# Patient Record
Sex: FEMALE | Race: WHITE | Employment: FULL TIME | URBAN - METROPOLITAN AREA
[De-identification: names, ages, dates, MRNs, and addresses within clinical notes are randomized per-mention and may not be internally consistent; named-entity substitution may affect disease eponyms.]

---

## 2021-09-03 ENCOUNTER — HOSPITAL ENCOUNTER (OUTPATIENT)
Dept: MAMMOGRAPHY | Age: 41
Discharge: HOME | End: 2021-09-03
Attending: FAMILY MEDICINE

## 2021-09-03 DIAGNOSIS — Z12.31 ENCOUNTER FOR SCREENING MAMMOGRAM FOR BREAST CANCER: ICD-10-CM

## 2022-06-06 DIAGNOSIS — R05.9 COUGH, UNSPECIFIED: ICD-10-CM

## 2022-06-06 RX ORDER — CODEINE PHOSPHATE AND GUAIFENESIN 10; 100 MG/5ML; MG/5ML
SOLUTION ORAL
Qty: 120 ML | Refills: 0 | OUTPATIENT
Start: 2022-06-06

## 2022-08-18 RX ORDER — DICLOFENAC SODIUM 75 MG/1
TABLET, DELAYED RELEASE ORAL
Qty: 60 TABLET | Refills: 6 | Status: SHIPPED | OUTPATIENT
Start: 2022-08-18

## 2022-09-09 ENCOUNTER — HOSPITAL ENCOUNTER (OUTPATIENT)
Dept: MAMMOGRAPHY | Age: 42
Discharge: HOME | End: 2022-09-12

## 2022-09-09 DIAGNOSIS — Z12.31 VISIT FOR SCREENING MAMMOGRAM: ICD-10-CM

## 2022-09-20 DIAGNOSIS — R63.5 ABNORMAL WEIGHT GAIN: ICD-10-CM

## 2022-09-22 RX ORDER — PHENTERMINE HYDROCHLORIDE 37.5 MG/1
TABLET ORAL
Qty: 30 TABLET | Refills: 3 | OUTPATIENT
Start: 2022-09-22

## 2022-09-24 DIAGNOSIS — R63.5 ABNORMAL WEIGHT GAIN: Primary | ICD-10-CM

## 2022-09-26 RX ORDER — PHENTERMINE HYDROCHLORIDE 37.5 MG/1
TABLET ORAL
Qty: 30 TABLET | Refills: 5 | Status: SHIPPED | OUTPATIENT
Start: 2022-09-26 | End: 2023-03-25

## 2023-01-08 ENCOUNTER — PATIENT MESSAGE (OUTPATIENT)
Dept: FAMILY MEDICINE CLINIC | Facility: CLINIC | Age: 43
End: 2023-01-08

## 2023-01-08 DIAGNOSIS — R42 DIZZINESS: ICD-10-CM

## 2023-01-08 DIAGNOSIS — R51.9 ACUTE NONINTRACTABLE HEADACHE, UNSPECIFIED HEADACHE TYPE: Primary | ICD-10-CM

## 2023-01-09 NOTE — TELEPHONE ENCOUNTER
From: Olivia Reyes  To: Dr. Rukhsana Adams: 1/8/2023 8:47 PM EST  Subject: CT Scan    Dr Karan Garcia  I had a few episodes Friday where I felt light headed, broke out in sweats, blurry vision, dizziness  i went on to the ER out of concern and my BP was a little high- which has never been an issue - but there were no other concerns. After getting home, I began to have a headache & kept a headache for over 24 hours.    After [facility], I want to ease my conscience and wanted to inquire about a CT w contrast

## 2023-01-09 NOTE — TELEPHONE ENCOUNTER
I will go ahead and put in an order a CT for the patient, if symptoms worsen she may need to go back to the ER

## 2023-01-10 NOTE — TELEPHONE ENCOUNTER
Regarding: CT Scan  ----- Message from Dillan Banerjee MD sent at 1/9/2023  1:56 PM EST -----       ----- Message from Vivek Ortez \"Karen\" to Dillan Banerjee MD sent at 1/8/2023  8:47 PM -----   Dr Gemma Edwards  I had a few episodes Friday where I felt light headed, broke out in sweats, blurry vision, dizziness  i went on to the ER out of concern and my BP was a little high- which has never been an issue -  but there were no other concerns. After getting home, I began to have a headache & kept a headache for over 24 hours.    After Vernal Legato scare, I want to ease my conscience and wanted to inquire about a CT w contrast

## 2023-01-20 ENCOUNTER — HOSPITAL ENCOUNTER (OUTPATIENT)
Dept: CT IMAGING | Age: 43
Discharge: HOME | End: 2023-01-20
Payer: COMMERCIAL

## 2023-01-20 DIAGNOSIS — R42 DIZZINESS: ICD-10-CM

## 2023-01-20 DIAGNOSIS — R51.9 ACUTE NONINTRACTABLE HEADACHE, UNSPECIFIED HEADACHE TYPE: ICD-10-CM

## 2023-01-20 PROCEDURE — 70450 CT HEAD/BRAIN W/O DYE: CPT

## 2023-01-21 NOTE — RESULT ENCOUNTER NOTE
The CT scan overall looks normal, let me know if you are continuing to have any persistent problems.

## 2023-03-23 RX ORDER — DICLOFENAC SODIUM 75 MG/1
TABLET, DELAYED RELEASE ORAL
Qty: 60 TABLET | Refills: 6 | Status: SHIPPED | OUTPATIENT
Start: 2023-03-23

## 2023-03-30 DIAGNOSIS — R63.5 ABNORMAL WEIGHT GAIN: ICD-10-CM

## 2023-03-31 RX ORDER — PHENTERMINE HYDROCHLORIDE 37.5 MG/1
TABLET ORAL
Qty: 30 TABLET | Refills: 3 | Status: SHIPPED | OUTPATIENT
Start: 2023-03-31 | End: 2023-09-27

## 2023-04-11 ENCOUNTER — NURSE ONLY (OUTPATIENT)
Dept: FAMILY MEDICINE CLINIC | Facility: CLINIC | Age: 43
End: 2023-04-11

## 2023-04-11 DIAGNOSIS — E78.1 HYPERTRIGLYCERIDEMIA: ICD-10-CM

## 2023-04-11 DIAGNOSIS — R10.32 LLQ ABDOMINAL PAIN: ICD-10-CM

## 2023-04-11 DIAGNOSIS — R63.5 WEIGHT GAIN: ICD-10-CM

## 2023-04-11 LAB
ALBUMIN SERPL-MCNC: 4 G/DL (ref 3.5–5)
ALBUMIN/GLOB SERPL: 1.3 (ref 0.4–1.6)
ALP SERPL-CCNC: 51 U/L (ref 50–136)
ALT SERPL-CCNC: 25 U/L (ref 12–65)
ANION GAP SERPL CALC-SCNC: 4 MMOL/L (ref 2–11)
AST SERPL-CCNC: 18 U/L (ref 15–37)
BASOPHILS # BLD: 0 K/UL (ref 0–0.2)
BASOPHILS NFR BLD: 0 % (ref 0–2)
BILIRUB SERPL-MCNC: 0.4 MG/DL (ref 0.2–1.1)
BUN SERPL-MCNC: 12 MG/DL (ref 6–23)
CALCIUM SERPL-MCNC: 8.6 MG/DL (ref 8.3–10.4)
CHLORIDE SERPL-SCNC: 108 MMOL/L (ref 101–110)
CHOLEST SERPL-MCNC: 164 MG/DL
CO2 SERPL-SCNC: 26 MMOL/L (ref 21–32)
CREAT SERPL-MCNC: 0.7 MG/DL (ref 0.6–1)
DIFFERENTIAL METHOD BLD: NORMAL
EOSINOPHIL # BLD: 0.1 K/UL (ref 0–0.8)
EOSINOPHIL NFR BLD: 1 % (ref 0.5–7.8)
ERYTHROCYTE [DISTWIDTH] IN BLOOD BY AUTOMATED COUNT: 13.8 % (ref 11.9–14.6)
GLOBULIN SER CALC-MCNC: 3.2 G/DL (ref 2.8–4.5)
GLUCOSE SERPL-MCNC: 79 MG/DL (ref 65–100)
HCT VFR BLD AUTO: 38.9 % (ref 35.8–46.3)
HDLC SERPL-MCNC: 44 MG/DL (ref 40–60)
HDLC SERPL: 3.7
HGB BLD-MCNC: 12.6 G/DL (ref 11.7–15.4)
IMM GRANULOCYTES # BLD AUTO: 0 K/UL (ref 0–0.5)
IMM GRANULOCYTES NFR BLD AUTO: 0 % (ref 0–5)
LDLC SERPL CALC-MCNC: 105 MG/DL
LYMPHOCYTES # BLD: 1.5 K/UL (ref 0.5–4.6)
LYMPHOCYTES NFR BLD: 29 % (ref 13–44)
MCH RBC QN AUTO: 28.1 PG (ref 26.1–32.9)
MCHC RBC AUTO-ENTMCNC: 32.4 G/DL (ref 31.4–35)
MCV RBC AUTO: 86.6 FL (ref 82–102)
MONOCYTES # BLD: 0.4 K/UL (ref 0.1–1.3)
MONOCYTES NFR BLD: 7 % (ref 4–12)
NEUTS SEG # BLD: 3.2 K/UL (ref 1.7–8.2)
NEUTS SEG NFR BLD: 63 % (ref 43–78)
NRBC # BLD: 0 K/UL (ref 0–0.2)
PLATELET # BLD AUTO: 273 K/UL (ref 150–450)
PMV BLD AUTO: 9.8 FL (ref 9.4–12.3)
POTASSIUM SERPL-SCNC: 3.7 MMOL/L (ref 3.5–5.1)
PROT SERPL-MCNC: 7.2 G/DL (ref 6.3–8.2)
RBC # BLD AUTO: 4.49 M/UL (ref 4.05–5.2)
SODIUM SERPL-SCNC: 138 MMOL/L (ref 133–143)
TRIGL SERPL-MCNC: 75 MG/DL (ref 35–150)
TSH, 3RD GENERATION: 0.51 UIU/ML (ref 0.36–3.74)
VLDLC SERPL CALC-MCNC: 15 MG/DL (ref 6–23)
WBC # BLD AUTO: 5.2 K/UL (ref 4.3–11.1)

## 2023-04-25 ENCOUNTER — TELEPHONE (OUTPATIENT)
Dept: FAMILY MEDICINE CLINIC | Facility: CLINIC | Age: 43
End: 2023-04-25

## 2023-04-25 NOTE — TELEPHONE ENCOUNTER
See if we could postpone the CT scheduled for the MRI as it may take a couple days for me to get a hold of the peer to peer reviewer and let the patient know

## 2023-04-25 NOTE — TELEPHONE ENCOUNTER
Female from [facility] Verification Dept called stating pt is scheduled to have CT of abd and pelvis but when authorizing the pt's insurance states they must do a peer to peer. Requesting Dr. Alexis Mora call [phone] to complete peer to peer. Tracking number is [id number].

## 2023-04-28 NOTE — TELEPHONE ENCOUNTER
CT approved, Auth # [id number]. Approved until May 26, 2023. Will notify radiology let them know that apparently they had not received my clinical notes which was why they required the peer to peer.

## 2023-05-05 ENCOUNTER — HOSPITAL ENCOUNTER (OUTPATIENT)
Dept: CT IMAGING | Age: 43
Discharge: HOME | End: 2023-05-05
Payer: COMMERCIAL

## 2023-05-05 DIAGNOSIS — R10.32 LLQ ABDOMINAL PAIN: ICD-10-CM

## 2023-05-05 PROCEDURE — 74177 CT ABD & PELVIS W/CONTRAST: CPT

## 2023-05-05 PROCEDURE — 6360000004 HC RX CONTRAST MEDICATION: Performed by: FAMILY MEDICINE

## 2023-05-05 PROCEDURE — 2580000003 HC RX 258: Performed by: FAMILY MEDICINE

## 2023-05-05 RX ORDER — 0.9 % SODIUM CHLORIDE 0.9 %
100 INTRAVENOUS SOLUTION INTRAVENOUS ONCE
Status: COMPLETED | OUTPATIENT
Start: 2023-05-05 | End: 2023-05-05

## 2023-05-05 RX ORDER — SODIUM CHLORIDE 0.9 % (FLUSH) 0.9 %
10 SYRINGE (ML) INJECTION
Status: COMPLETED | OUTPATIENT
Start: 2023-05-05 | End: 2023-05-05

## 2023-05-05 RX ADMIN — DIATRIZOATE MEGLUMINE AND DIATRIZOATE SODIUM 15 ML: 660; 100 LIQUID ORAL; RECTAL at 15:01

## 2023-05-05 RX ADMIN — IOPAMIDOL 100 ML: 755 INJECTION, SOLUTION INTRAVENOUS at 15:01

## 2023-05-05 RX ADMIN — SODIUM CHLORIDE 100 ML: 9 INJECTION, SOLUTION INTRAVENOUS at 15:01

## 2023-05-05 RX ADMIN — SODIUM CHLORIDE, PRESERVATIVE FREE 10 ML: 5 INJECTION INTRAVENOUS at 15:01

## 2023-05-09 NOTE — RESULT ENCOUNTER NOTE
She has some diverticulosis but not diverticulitis. There was a cyst on the right ovary which appears to be a normal corpus luteal cyst formed during normal menstrual cycle. Overall the CT looks good.

## 2023-06-25 ENCOUNTER — PATIENT MESSAGE (OUTPATIENT)
Dept: FAMILY MEDICINE CLINIC | Facility: CLINIC | Age: 43
End: 2023-06-25

## 2023-06-25 DIAGNOSIS — M35.00 SJOGREN SYNDROME, UNSPECIFIED (HCC): Primary | ICD-10-CM

## 2023-07-30 DIAGNOSIS — R63.5 ABNORMAL WEIGHT GAIN: ICD-10-CM

## 2023-07-31 RX ORDER — PHENTERMINE HYDROCHLORIDE 37.5 MG/1
TABLET ORAL
Qty: 30 TABLET | Refills: 3 | Status: SHIPPED | OUTPATIENT
Start: 2023-07-31 | End: 2024-01-27

## 2023-08-01 ENCOUNTER — TELEPHONE (OUTPATIENT)
Dept: FAMILY MEDICINE CLINIC | Facility: CLINIC | Age: 43
End: 2023-08-01

## 2023-08-01 NOTE — TELEPHONE ENCOUNTER
----- Message from [employer] sent at 8/1/2023  9:10 AM EDT -----  Subject: Message to Provider    QUESTIONS  Information for Provider? Patient would like to be seen before 10/05/2023   for joint pain. Please contact her if someone cancels. ---------------------------------------------------------------------------  --------------  Vernell RETANA  [phone]; OK to leave message on [employer],OK to respond with electronic   message via [employer] portal (only for patients who have registered [employer]   account)  ---------------------------------------------------------------------------  --------------  SCRIPT ANSWERS  Relationship to Patient?  Self

## 2023-08-15 ENCOUNTER — OFFICE VISIT (OUTPATIENT)
Dept: FAMILY MEDICINE CLINIC | Facility: CLINIC | Age: 43
End: 2023-08-15
Payer: COMMERCIAL

## 2023-08-15 VITALS
SYSTOLIC BLOOD PRESSURE: 140 MMHG | TEMPERATURE: 98.5 F | DIASTOLIC BLOOD PRESSURE: 93 MMHG | BODY MASS INDEX: 28.85 KG/M2 | HEART RATE: 73 BPM | OXYGEN SATURATION: 99 % | WEIGHT: 169 LBS | RESPIRATION RATE: 16 BRPM | HEIGHT: 64 IN

## 2023-08-15 DIAGNOSIS — E78.1 HYPERTRIGLYCERIDEMIA: ICD-10-CM

## 2023-08-15 DIAGNOSIS — M25.50 POLYARTHRALGIA: ICD-10-CM

## 2023-08-15 DIAGNOSIS — L57.8 SOLAR DERMATITIS: ICD-10-CM

## 2023-08-15 DIAGNOSIS — M35.00 SJOGREN SYNDROME, UNSPECIFIED (HCC): Primary | ICD-10-CM

## 2023-08-15 DIAGNOSIS — R63.5 ABNORMAL WEIGHT GAIN: ICD-10-CM

## 2023-08-15 PROBLEM — F33.0 MAJOR DEPRESSIVE DISORDER, RECURRENT, MILD (HCC): Status: ACTIVE | Noted: 2023-08-15

## 2023-08-15 LAB
CRP SERPL-MCNC: <0.3 MG/DL (ref 0–0.9)
ERYTHROCYTE [SEDIMENTATION RATE] IN BLOOD: 5 MM/HR (ref 0–20)

## 2023-08-15 PROCEDURE — 99214 OFFICE O/P EST MOD 30 MIN: CPT | Performed by: FAMILY MEDICINE

## 2023-08-15 RX ORDER — DIETHYLPROPION HYDROCHLORIDE 75 MG/1
75 TABLET ORAL DAILY
Qty: 30 TABLET | Refills: 5 | Status: SHIPPED | OUTPATIENT
Start: 2023-08-15 | End: 2024-02-11

## 2023-08-15 RX ORDER — TRIAMCINOLONE ACETONIDE 5 MG/G
CREAM TOPICAL
Qty: 15 G | Refills: 1 | Status: SHIPPED | OUTPATIENT
Start: 2023-08-15 | End: 2023-08-22

## 2023-08-15 NOTE — PROGRESS NOTES
Subjective:      Patient ID: Elena Pearson is a 37 y.o. female. HPI  Patient comes in today for follow-up with some worsening joint pains and stiffness that is worse in the mornings. She is having pains in her shoulders, elbows hands as well as knees. She was diagnosed with Sjogren syndrome in the past and saw a rheumatology a number years ago. Patient has struggled to lose weight and has tried Adipex in the past without much improvement. She would like to lose weight as she feels that that would help also with her joint pain particularly in the knees. Patient also has noticed an increased sun sensitivity and she was going regularly to the tanning bed but developed a pruritic rash over her chest after going earlier this year so she stopped that. The rash has improved but has not completely resolved. Past Medical History:   Diagnosis Date    Fibromyalgia     Sjogren's disease (HCC)        Allergies   Allergen Reactions    Duloxetine Hcl Anxiety       Current Outpatient Medications   Medication Sig Dispense Refill    Diethylpropion HCl CR 75 MG TB24 Take 75 mg by mouth daily for 180 days. Max Daily Amount: 75 mg 30 tablet 5    triamcinolone (ARISTOCORT) 0.5 % cream Apply topically 2 times daily x 7d . 15 g 1    phentermine (ADIPEX-P) 37.5 MG tablet TAKE 1 TABLET BY MOUTH EVERY MORNING. MAX DAILY AMOUNT: 37.5 MG. 30 tablet 3    meloxicam (MOBIC) 15 MG tablet Take 1 tablet by mouth daily as needed for Pain 30 tablet 5    diclofenac (VOLTAREN) 75 MG EC tablet TAKE 1 TABLET BY MOUTH TWO (2) TIMES A DAY AS NEEDED WITH FOOD 60 tablet 6     No current facility-administered medications for this visit. Social History     Tobacco Use    Smoking status: Never     Passive exposure: Never    Smokeless tobacco: Never   Substance Use Topics    Alcohol use: Not Currently    Drug use: No     Review of Systems   Musculoskeletal:  Positive for arthralgias. Skin:  Positive for rash.      Blood pressure (!) 140/93,

## 2023-08-16 LAB
ANA SER QL: POSITIVE
CENTROMERE B AB SER-ACNC: <0.2 AI (ref 0–0.9)
CHROMATIN AB SERPL-ACNC: <0.2 AI (ref 0–0.9)
DSDNA AB SER-ACNC: 2 IU/ML (ref 0–9)
ENA JO1 AB SER-ACNC: <0.2 AI (ref 0–0.9)
ENA RNP AB SER-ACNC: <0.2 AI (ref 0–0.9)
ENA SCL70 AB SER-ACNC: <0.2 AI (ref 0–0.9)
ENA SM AB SER-ACNC: <0.2 AI (ref 0–0.9)
ENA SS-A AB SER-ACNC: 1.6 AI (ref 0–0.9)
ENA SS-B AB SER-ACNC: <0.2 AI (ref 0–0.9)
Lab: ABNORMAL
RHEUMATOID FACT SER QL LA: POSITIVE
RHEUMATOID FACT TITR SER LA: NORMAL {TITER}

## 2023-08-17 ENCOUNTER — TELEPHONE (OUTPATIENT)
Dept: FAMILY MEDICINE CLINIC | Facility: CLINIC | Age: 43
End: 2023-08-17

## 2023-08-17 DIAGNOSIS — R76.8 POSITIVE ANA (ANTINUCLEAR ANTIBODY): Primary | ICD-10-CM

## 2023-08-17 DIAGNOSIS — M05.80 POLYARTHRITIS WITH POSITIVE RHEUMATOID FACTOR (HCC): ICD-10-CM

## 2023-08-17 NOTE — RESULT ENCOUNTER NOTE
The NITZA and rheumatoid factor were both positive which does suggest an autoimmune type process. Inflammatory markers were good. I would suggest tilting again with rheumatology I can put in a referral, see if she has a preference on seeing someone.

## 2023-08-17 NOTE — TELEPHONE ENCOUNTER
Spoke with pt about results. She wants a referral sent to Mickle Kocher at [facility].

## 2023-08-18 ENCOUNTER — PATIENT MESSAGE (OUTPATIENT)
Dept: FAMILY MEDICINE CLINIC | Facility: CLINIC | Age: 43
End: 2023-08-18

## 2023-08-18 RX ORDER — CELECOXIB 200 MG/1
200 CAPSULE ORAL DAILY
Qty: 30 CAPSULE | Refills: 3 | Status: SHIPPED | OUTPATIENT
Start: 2023-08-18

## 2023-08-18 NOTE — TELEPHONE ENCOUNTER
Sent in prescription for:     Requested Prescriptions     Signed Prescriptions Disp Refills    celecoxib (CELEBREX) 200 MG capsule 30 capsule 3     Sig: Take 1 capsule by mouth daily Stop Meloxicam     Authorizing Provider: Sue Ortiz

## 2023-10-05 ENCOUNTER — PATIENT MESSAGE (OUTPATIENT)
Dept: FAMILY MEDICINE CLINIC | Facility: CLINIC | Age: 43
End: 2023-10-05

## 2023-10-30 ENCOUNTER — PATIENT MESSAGE (OUTPATIENT)
Dept: FAMILY MEDICINE CLINIC | Facility: CLINIC | Age: 43
End: 2023-10-30

## 2023-10-31 RX ORDER — OMEPRAZOLE 40 MG/1
40 CAPSULE, DELAYED RELEASE ORAL
Qty: 90 CAPSULE | Refills: 3 | Status: SHIPPED | OUTPATIENT
Start: 2023-10-31

## 2023-10-31 NOTE — TELEPHONE ENCOUNTER
Sent in prescription for:     Requested Prescriptions     Signed Prescriptions Disp Refills    sertraline (ZOLOFT) 50 MG tablet 90 tablet 1     Sig: Take 1 tablet by mouth daily 1/2 tablet po daily x 7 days then whole tablet     Authorizing Provider: Jc Marino

## 2023-12-13 RX ORDER — CELECOXIB 200 MG/1
200 CAPSULE ORAL DAILY
Qty: 30 CAPSULE | Refills: 1 | Status: SHIPPED | OUTPATIENT
Start: 2023-12-13

## 2023-12-21 DIAGNOSIS — R63.5 ABNORMAL WEIGHT GAIN: ICD-10-CM

## 2023-12-21 RX ORDER — DIETHYLPROPION HYDROCHLORIDE 75 MG/1
75 TABLET ORAL DAILY
Qty: 30 TABLET | Refills: 5 | OUTPATIENT
Start: 2023-12-21 | End: 2024-06-18

## 2024-02-12 DIAGNOSIS — R63.5 ABNORMAL WEIGHT GAIN: ICD-10-CM

## 2024-02-12 DIAGNOSIS — L57.8 SOLAR DERMATITIS: ICD-10-CM

## 2024-02-12 RX ORDER — TRIAMCINOLONE ACETONIDE 5 MG/G
CREAM TOPICAL
Qty: 15 G | Refills: 1 | Status: SHIPPED | OUTPATIENT
Start: 2024-02-12

## 2024-02-12 RX ORDER — DIETHYLPROPION HYDROCHLORIDE 75 MG/1
75 TABLET ORAL DAILY
Qty: 30 TABLET | Refills: 3 | Status: SHIPPED | OUTPATIENT
Start: 2024-02-12 | End: 2024-08-10

## 2024-02-15 ENCOUNTER — OFFICE VISIT (OUTPATIENT)
Dept: FAMILY MEDICINE CLINIC | Facility: CLINIC | Age: 44
End: 2024-02-15
Payer: COMMERCIAL

## 2024-02-15 VITALS
HEART RATE: 81 BPM | DIASTOLIC BLOOD PRESSURE: 90 MMHG | TEMPERATURE: 98.3 F | BODY MASS INDEX: 27.69 KG/M2 | RESPIRATION RATE: 16 BRPM | HEIGHT: 64 IN | SYSTOLIC BLOOD PRESSURE: 142 MMHG | WEIGHT: 162.2 LBS | OXYGEN SATURATION: 97 %

## 2024-02-15 DIAGNOSIS — M06.9 RHEUMATOID ARTHRITIS, INVOLVING UNSPECIFIED SITE, UNSPECIFIED WHETHER RHEUMATOID FACTOR PRESENT (HCC): Primary | ICD-10-CM

## 2024-02-15 DIAGNOSIS — F33.0 MAJOR DEPRESSIVE DISORDER, RECURRENT, MILD (HCC): ICD-10-CM

## 2024-02-15 PROCEDURE — 99213 OFFICE O/P EST LOW 20 MIN: CPT | Performed by: FAMILY MEDICINE

## 2024-02-15 RX ORDER — ESCITALOPRAM OXALATE 10 MG/1
10 TABLET ORAL DAILY
Qty: 30 TABLET | Refills: 3 | Status: SHIPPED | OUTPATIENT
Start: 2024-02-15

## 2024-02-15 RX ORDER — HYDROXYCHLOROQUINE SULFATE 200 MG/1
200 TABLET, FILM COATED ORAL DAILY
COMMUNITY
Start: 2024-01-05 | End: 2024-02-04

## 2024-02-15 RX ORDER — HYDROXYCHLOROQUINE SULFATE 200 MG/1
200 TABLET, FILM COATED ORAL DAILY
COMMUNITY

## 2024-02-15 NOTE — PROGRESS NOTES
Subjective:      Patient ID: Camila Hadley is a 43 y.o. female.    HPI  Patient comes in today for follow-up on her anxiety, medication refills.  She has seen rheumatology and they are have a follow-up next month but put her on Plaquenil for possible rheumatoid arthritis.  She stopped taking it in the last 2 days because she started having some visual problems and was concerned it was a side effect.  She also is no longer taking the Zoloft as she was having some headaches with that which resolved after stopping it.  Still deals a lot with anxiety.  Past Medical History:   Diagnosis Date    Fibromyalgia     Sjogren's disease (HCC)        Allergies   Allergen Reactions    Duloxetine Hcl Anxiety       Current Outpatient Medications   Medication Sig Dispense Refill    hydroxychloroquine (PLAQUENIL) 200 MG tablet Take 1 tablet by mouth daily      escitalopram (LEXAPRO) 10 MG tablet Take 1 tablet by mouth daily 30 tablet 3    Diethylpropion HCl CR 75 MG TB24 Take 75 mg by mouth daily for 180 days. Max Daily Amount: 75 mg 30 tablet 3    triamcinolone (ARISTOCORT) 0.5 % cream APPLY TOPICALLY 2 TIMES DAILY X 7 DAYS . 15 g 1    celecoxib (CELEBREX) 200 MG capsule TAKE 1 CAPSULE BY MOUTH DAILY STOP MELOXICAM 30 capsule 1    omeprazole (PRILOSEC) 40 MG delayed release capsule Take 1 capsule by mouth every morning (before breakfast) 90 capsule 3     No current facility-administered medications for this visit.       Social History     Tobacco Use    Smoking status: Never     Passive exposure: Never    Smokeless tobacco: Never   Substance Use Topics    Alcohol use: Not Currently    Drug use: No     Review of Systems   Musculoskeletal:  Positive for arthralgias.   Psychiatric/Behavioral:  Positive for dysphoric mood. The patient is nervous/anxious.      Blood pressure (!) 142/90, pulse 81, temperature 98.3 °F (36.8 °C), temperature source Temporal, resp. rate 16, height 1.626 m (5' 4\"), weight 73.6 kg (162 lb 3.2 oz), SpO2 97

## 2024-04-08 RX ORDER — CELECOXIB 200 MG/1
200 CAPSULE ORAL DAILY
Qty: 30 CAPSULE | Refills: 3 | Status: SHIPPED | OUTPATIENT
Start: 2024-04-08

## 2024-07-19 ENCOUNTER — TELEPHONE (OUTPATIENT)
Dept: FAMILY MEDICINE CLINIC | Facility: CLINIC | Age: 44
End: 2024-07-19

## 2024-07-19 DIAGNOSIS — R53.83 OTHER FATIGUE: ICD-10-CM

## 2024-07-19 DIAGNOSIS — R06.83 SNORING: Primary | ICD-10-CM

## 2024-07-19 NOTE — TELEPHONE ENCOUNTER
If she is not having any pain or discomfort I would watch.  See if she has been told that she snores and if so may want to check for sleep apnea

## 2024-07-19 NOTE — TELEPHONE ENCOUNTER
Pt stating she woke up this am and noted her tongue very blue.  Stating she had not eaten anything that would have discolored her tongue last pm.    Stating over a hour or so she said the tongue began to fade in color and now is light pink.  Pt concerned as what may have caused this.

## 2024-08-14 RX ORDER — CELECOXIB 200 MG/1
200 CAPSULE ORAL DAILY
Qty: 30 CAPSULE | Refills: 2 | Status: SHIPPED | OUTPATIENT
Start: 2024-08-14

## 2024-09-11 DIAGNOSIS — F33.0 MAJOR DEPRESSIVE DISORDER, RECURRENT, MILD (HCC): ICD-10-CM

## 2024-09-11 RX ORDER — ESCITALOPRAM OXALATE 10 MG/1
10 TABLET ORAL DAILY
Qty: 30 TABLET | Refills: 2 | Status: SHIPPED | OUTPATIENT
Start: 2024-09-11

## 2024-09-23 ENCOUNTER — HOSPITAL ENCOUNTER (OUTPATIENT)
Dept: MAMMOGRAPHY | Age: 44
Discharge: HOME | End: 2024-09-26
Payer: COMMERCIAL

## 2024-09-23 VITALS — WEIGHT: 160 LBS | BODY MASS INDEX: 27.46 KG/M2

## 2024-09-23 DIAGNOSIS — Z12.31 SCREENING MAMMOGRAM FOR BREAST CANCER: ICD-10-CM

## 2024-09-23 PROCEDURE — 77063 BREAST TOMOSYNTHESIS BI: CPT

## 2024-11-18 RX ORDER — CELECOXIB 200 MG/1
200 CAPSULE ORAL DAILY
Qty: 30 CAPSULE | Refills: 2 | Status: SHIPPED | OUTPATIENT
Start: 2024-11-18

## 2025-01-17 RX ORDER — OMEPRAZOLE 40 MG/1
40 CAPSULE, DELAYED RELEASE ORAL
Qty: 90 CAPSULE | Refills: 3 | Status: SHIPPED | OUTPATIENT
Start: 2025-01-17

## 2025-01-22 ENCOUNTER — PATIENT MESSAGE (OUTPATIENT)
Dept: FAMILY MEDICINE CLINIC | Facility: CLINIC | Age: 45
End: 2025-01-22

## 2025-01-22 DIAGNOSIS — R63.5 ABNORMAL WEIGHT GAIN: Primary | ICD-10-CM

## 2025-01-22 RX ORDER — PHENTERMINE HYDROCHLORIDE 37.5 MG/1
37.5 TABLET ORAL
Qty: 30 TABLET | Refills: 3 | Status: SHIPPED | OUTPATIENT
Start: 2025-01-22 | End: 2025-05-22

## 2025-01-23 NOTE — TELEPHONE ENCOUNTER
Sent in prescription for:     Requested Prescriptions     Signed Prescriptions Disp Refills    phentermine (ADIPEX-P) 37.5 MG tablet 30 tablet 3     Sig: Take 1 tablet by mouth every morning (before breakfast) for 120 days. Max Daily Amount: 37.5 mg     Authorizing Provider: IJEOMA BALLARD

## 2025-02-18 RX ORDER — CELECOXIB 200 MG/1
200 CAPSULE ORAL DAILY
Qty: 30 CAPSULE | Refills: 2 | Status: SHIPPED | OUTPATIENT
Start: 2025-02-18

## 2025-05-27 ENCOUNTER — TELEPHONE (OUTPATIENT)
Dept: FAMILY MEDICINE CLINIC | Facility: CLINIC | Age: 45
End: 2025-05-27

## 2025-05-27 DIAGNOSIS — N63.10 MASS OF RIGHT BREAST, UNSPECIFIED QUADRANT: Primary | ICD-10-CM

## 2025-05-27 NOTE — TELEPHONE ENCOUNTER
Pt calling asking if an order for a diagnostic mammogram. Pt is having breast pain and has a knot. VM is not set up and can not leave message to ask which breast. Sent message via [employer] to ask to put on order. Sent pt message to ask which breast she has knot and pain in to add on order.

## 2025-05-27 NOTE — TELEPHONE ENCOUNTER
Pt called back and stated its her right breast. Found a week ago and was on and off but now has been constant since friday. Please call pt back or [employer] message her once order is in.

## 2025-06-12 ENCOUNTER — HOSPITAL ENCOUNTER (OUTPATIENT)
Dept: MAMMOGRAPHY | Age: 45
Discharge: HOME | End: 2025-06-15
Attending: FAMILY MEDICINE

## 2025-06-12 DIAGNOSIS — N63.10 MASS OF RIGHT BREAST, UNSPECIFIED QUADRANT: ICD-10-CM

## 2025-06-12 PROCEDURE — G0279 TOMOSYNTHESIS, MAMMO: HCPCS

## 2025-06-12 PROCEDURE — 76642 ULTRASOUND BREAST LIMITED: CPT

## 2025-06-13 ENCOUNTER — RESULTS FOLLOW-UP (OUTPATIENT)
Dept: FAMILY MEDICINE CLINIC | Facility: CLINIC | Age: 45
End: 2025-06-13

## 2025-06-13 DIAGNOSIS — N63.10 MASS OF RIGHT BREAST, UNSPECIFIED QUADRANT: Primary | ICD-10-CM

## 2025-06-13 DIAGNOSIS — Z91.89 INCREASED RISK OF BREAST CANCER: ICD-10-CM

## 2025-06-13 DIAGNOSIS — R92.30 DENSE BREAST: ICD-10-CM

## 2025-06-13 DIAGNOSIS — Z80.3 FAMILY HISTORY OF BREAST CANCER: ICD-10-CM

## 2025-06-13 NOTE — RESULT ENCOUNTER NOTE
The mammogram and ultrasound did not show any concerns, they did recommend a repeat right breast ultrasound in 6 months just to be safe

## 2025-07-01 ENCOUNTER — TELEPHONE (OUTPATIENT)
Dept: FAMILY MEDICINE CLINIC | Facility: CLINIC | Age: 45
End: 2025-07-01

## 2025-07-01 ENCOUNTER — HOSPITAL ENCOUNTER (OUTPATIENT)
Dept: MRI IMAGING | Age: 45
Discharge: HOME | End: 2025-07-04
Attending: FAMILY MEDICINE

## 2025-07-01 DIAGNOSIS — Z80.3 FAMILY HISTORY OF BREAST CANCER: ICD-10-CM

## 2025-07-01 DIAGNOSIS — R92.30 DENSE BREAST: ICD-10-CM

## 2025-07-01 DIAGNOSIS — N63.10 MASS OF RIGHT BREAST, UNSPECIFIED QUADRANT: ICD-10-CM

## 2025-07-01 DIAGNOSIS — Z91.89 INCREASED RISK OF BREAST CANCER: ICD-10-CM

## 2025-07-01 PROCEDURE — 6360000004 HC RX CONTRAST MEDICATION: Performed by: FAMILY MEDICINE

## 2025-07-01 PROCEDURE — C8908 MRI W/O FOL W/CONT, BREAST,: HCPCS

## 2025-07-01 PROCEDURE — A9579 GAD-BASE MR CONTRAST NOS,1ML: HCPCS | Performed by: FAMILY MEDICINE

## 2025-07-01 RX ORDER — GADOTERIDOL 279.3 MG/ML
15 INJECTION INTRAVENOUS
Status: COMPLETED | OUTPATIENT
Start: 2025-07-01 | End: 2025-07-01

## 2025-07-01 RX ADMIN — GADOTERIDOL 15 ML: 279.3 INJECTION, SOLUTION INTRAVENOUS at 15:47

## 2025-07-02 ENCOUNTER — TELEPHONE (OUTPATIENT)
Dept: FAMILY MEDICINE CLINIC | Facility: CLINIC | Age: 45
End: 2025-07-02

## 2025-07-02 NOTE — TELEPHONE ENCOUNTER
[facility] called to let us know that she's routing an order for co-signature for breast mri biopsy.

## 2025-07-02 NOTE — TELEPHONE ENCOUNTER
Let the patient know that the MRI did show the area in her right breast where I think she has felt a nodule.  Since it is still showing up on the MRI I would consider referral to see a breast surgeon about possible needle biopsy.  Let me know if she is agreeable and if she has a preference on where she was referred.

## 2025-07-02 NOTE — TELEPHONE ENCOUNTER
[facility] called this morning wanting an order for biopsy. They have discussed with patient. Order routed to you for signature. Once biopsy comes back, they will let us know how the patient wants to proceed.

## 2025-07-09 ENCOUNTER — TELEPHONE (OUTPATIENT)
Dept: FAMILY MEDICINE CLINIC | Facility: CLINIC | Age: 45
End: 2025-07-09

## 2025-07-09 NOTE — TELEPHONE ENCOUNTER
[facility] has attempted to contact the patient over 3x for her upcoming breast MRI. States that because the patient is self pay, after multiple attempts they have to cancel. LVM for pt to call An[facility] as soon as possible.

## 2025-07-10 NOTE — TELEPHONE ENCOUNTER
I received a [employer] message that initially the patient was concerned about cost since she is self-pay but I think she may have qualified for some type of sponsorship.  Can we please call the patient to verify things have been worked out as it looks like she is scheduled for July 14

## 2025-07-10 NOTE — TELEPHONE ENCOUNTER
Spoke with Pt.   At this time she is scheduled for biopsy on 07/14/25 with results on 07/21/25.  She has applied for sponsorship

## 2025-07-14 ENCOUNTER — HOSPITAL ENCOUNTER (OUTPATIENT)
Dept: MRI IMAGING | Age: 45
Discharge: HOME | End: 2025-07-17

## 2025-07-14 ENCOUNTER — HOSPITAL ENCOUNTER (OUTPATIENT)
Dept: MAMMOGRAPHY | Age: 45
Discharge: HOME | End: 2025-07-17

## 2025-07-14 DIAGNOSIS — Z80.3 FAMILY HISTORY OF BREAST CANCER: ICD-10-CM

## 2025-07-14 DIAGNOSIS — R92.8 ABNORMAL MRI, BREAST: ICD-10-CM

## 2025-07-14 PROCEDURE — 77065 DX MAMMO INCL CAD UNI: CPT

## 2025-07-14 PROCEDURE — 6360000002 HC RX W HCPCS: Performed by: FAMILY MEDICINE

## 2025-07-14 PROCEDURE — 6360000004 HC RX CONTRAST MEDICATION: Performed by: FAMILY MEDICINE

## 2025-07-14 PROCEDURE — C1894 INTRO/SHEATH, NON-LASER: HCPCS

## 2025-07-14 PROCEDURE — A9579 GAD-BASE MR CONTRAST NOS,1ML: HCPCS | Performed by: FAMILY MEDICINE

## 2025-07-14 PROCEDURE — 88305 TISSUE EXAM BY PATHOLOGIST: CPT

## 2025-07-14 RX ORDER — GADOTERIDOL 279.3 MG/ML
20 INJECTION INTRAVENOUS
Status: COMPLETED | OUTPATIENT
Start: 2025-07-14 | End: 2025-07-14

## 2025-07-14 RX ORDER — LIDOCAINE HYDROCHLORIDE 10 MG/ML
5 INJECTION, SOLUTION INFILTRATION; PERINEURAL ONCE
Status: COMPLETED | OUTPATIENT
Start: 2025-07-14 | End: 2025-07-14

## 2025-07-14 RX ORDER — LIDOCAINE HYDROCHLORIDE AND EPINEPHRINE 10; 10 MG/ML; UG/ML
20 INJECTION, SOLUTION INFILTRATION; PERINEURAL ONCE
Status: COMPLETED | OUTPATIENT
Start: 2025-07-14 | End: 2025-07-14

## 2025-07-14 RX ADMIN — LIDOCAINE HYDROCHLORIDE 5 ML: 10 INJECTION, SOLUTION INFILTRATION; PERINEURAL at 13:00

## 2025-07-14 RX ADMIN — LIDOCAINE HYDROCHLORIDE,EPINEPHRINE BITARTRATE 20 ML: 10; .01 INJECTION, SOLUTION INFILTRATION; PERINEURAL at 12:57

## 2025-07-14 RX ADMIN — GADOTERIDOL 20 ML: 279.3 INJECTION, SOLUTION INTRAVENOUS at 12:32

## 2025-07-14 ASSESSMENT — PAIN SCALES - GENERAL
PAINLEVEL_OUTOF10: 5
PAINLEVEL_OUTOF10: 5

## 2025-07-14 ASSESSMENT — PAIN DESCRIPTION - LOCATION: LOCATION: BREAST

## 2025-07-14 ASSESSMENT — PAIN DESCRIPTION - DESCRIPTORS: DESCRIPTORS: DULL

## 2025-07-14 ASSESSMENT — PAIN DESCRIPTION - ORIENTATION: ORIENTATION: RIGHT

## 2025-07-16 ENCOUNTER — TELEPHONE (OUTPATIENT)
Dept: MAMMOGRAPHY | Age: 45
End: 2025-07-16

## 2025-07-16 NOTE — TELEPHONE ENCOUNTER
I spoke with the patient to discus the results of her recent breast biopsy. Pathology: benign. She had no post biopsy issues or concerns and will return in June 2026 for her yearly screening mammogram and a bilateral breast MRI if clinically indicated

## 2025-07-23 DIAGNOSIS — R63.5 ABNORMAL WEIGHT GAIN: ICD-10-CM

## 2025-07-24 RX ORDER — PHENTERMINE HYDROCHLORIDE 37.5 MG/1
37.5 TABLET ORAL
Qty: 30 TABLET | Refills: 3 | Status: SHIPPED | OUTPATIENT
Start: 2025-07-24 | End: 2025-11-21